# Patient Record
Sex: FEMALE | Race: WHITE | NOT HISPANIC OR LATINO | URBAN - METROPOLITAN AREA
[De-identification: names, ages, dates, MRNs, and addresses within clinical notes are randomized per-mention and may not be internally consistent; named-entity substitution may affect disease eponyms.]

---

## 2019-08-27 ENCOUNTER — OFFICE (OUTPATIENT)
Dept: URBAN - METROPOLITAN AREA CLINIC 19 | Facility: CLINIC | Age: 49
End: 2019-08-27

## 2019-08-27 VITALS
SYSTOLIC BLOOD PRESSURE: 117 MMHG | HEART RATE: 70 BPM | WEIGHT: 142 LBS | HEIGHT: 65 IN | DIASTOLIC BLOOD PRESSURE: 75 MMHG

## 2019-08-27 DIAGNOSIS — K58.9 IRRITABLE BOWEL SYNDROME WITHOUT DIARRHEA: ICD-10-CM

## 2019-08-27 DIAGNOSIS — R10.84 GENERALIZED ABDOMINAL PAIN: ICD-10-CM

## 2019-08-27 PROCEDURE — 99203 OFFICE O/P NEW LOW 30 MIN: CPT | Performed by: INTERNAL MEDICINE

## 2019-08-27 RX ORDER — DICYCLOMINE HYDROCHLORIDE 20 MG/1
TABLET ORAL
Qty: 60 | Refills: 11 | Status: ACTIVE
End: 2023-05-09

## 2020-03-03 ENCOUNTER — OFFICE (OUTPATIENT)
Dept: URBAN - METROPOLITAN AREA CLINIC 19 | Facility: CLINIC | Age: 50
End: 2020-03-03

## 2020-03-03 VITALS
HEART RATE: 79 BPM | HEIGHT: 65 IN | SYSTOLIC BLOOD PRESSURE: 129 MMHG | DIASTOLIC BLOOD PRESSURE: 85 MMHG | WEIGHT: 152 LBS

## 2020-03-03 DIAGNOSIS — R10.84 GENERALIZED ABDOMINAL PAIN: ICD-10-CM

## 2020-03-03 DIAGNOSIS — K58.9 IRRITABLE BOWEL SYNDROME WITHOUT DIARRHEA: ICD-10-CM

## 2020-03-03 PROCEDURE — 99213 OFFICE O/P EST LOW 20 MIN: CPT | Performed by: INTERNAL MEDICINE

## 2020-03-03 RX ORDER — DICYCLOMINE HYDROCHLORIDE 20 MG/1
TABLET ORAL
Qty: 60 | Refills: 11 | Status: ACTIVE
End: 2023-05-09

## 2021-04-06 ENCOUNTER — OFFICE (OUTPATIENT)
Dept: URBAN - METROPOLITAN AREA CLINIC 19 | Facility: CLINIC | Age: 51
End: 2021-04-06

## 2021-04-06 VITALS
HEIGHT: 65 IN | DIASTOLIC BLOOD PRESSURE: 70 MMHG | HEART RATE: 75 BPM | OXYGEN SATURATION: 98 % | WEIGHT: 160 LBS | SYSTOLIC BLOOD PRESSURE: 118 MMHG

## 2021-04-06 DIAGNOSIS — K58.9 IRRITABLE BOWEL SYNDROME WITHOUT DIARRHEA: ICD-10-CM

## 2021-04-06 PROCEDURE — 99213 OFFICE O/P EST LOW 20 MIN: CPT | Performed by: INTERNAL MEDICINE

## 2021-04-06 RX ORDER — DICYCLOMINE HYDROCHLORIDE 20 MG/1
TABLET ORAL
Qty: 60 | Refills: 11 | Status: ACTIVE
End: 2023-05-09

## 2022-04-12 ENCOUNTER — OFFICE (OUTPATIENT)
Dept: URBAN - METROPOLITAN AREA CLINIC 19 | Facility: CLINIC | Age: 52
End: 2022-04-12

## 2022-04-12 VITALS
SYSTOLIC BLOOD PRESSURE: 131 MMHG | HEIGHT: 65 IN | OXYGEN SATURATION: 98 % | DIASTOLIC BLOOD PRESSURE: 86 MMHG | WEIGHT: 155 LBS | HEART RATE: 75 BPM

## 2022-04-12 DIAGNOSIS — K58.9 IRRITABLE BOWEL SYNDROME WITHOUT DIARRHEA: ICD-10-CM

## 2022-04-12 PROCEDURE — 99213 OFFICE O/P EST LOW 20 MIN: CPT | Performed by: INTERNAL MEDICINE

## 2022-04-12 RX ORDER — DICYCLOMINE HYDROCHLORIDE 20 MG/1
TABLET ORAL
Qty: 60 | Refills: 11 | Status: ACTIVE
End: 2023-05-09

## 2023-05-02 ENCOUNTER — OFFICE (OUTPATIENT)
Dept: URBAN - METROPOLITAN AREA CLINIC 19 | Facility: CLINIC | Age: 53
End: 2023-05-02

## 2023-05-02 VITALS
OXYGEN SATURATION: 99 % | HEIGHT: 65 IN | HEART RATE: 70 BPM | DIASTOLIC BLOOD PRESSURE: 84 MMHG | SYSTOLIC BLOOD PRESSURE: 150 MMHG | WEIGHT: 155 LBS

## 2023-05-02 DIAGNOSIS — R10.84 GENERALIZED ABDOMINAL PAIN: ICD-10-CM

## 2023-05-02 DIAGNOSIS — K58.9 IRRITABLE BOWEL SYNDROME WITHOUT DIARRHEA: ICD-10-CM

## 2023-05-02 PROCEDURE — 99214 OFFICE O/P EST MOD 30 MIN: CPT | Performed by: INTERNAL MEDICINE

## 2023-05-02 RX ORDER — DICYCLOMINE HYDROCHLORIDE 20 MG/1
TABLET ORAL
Qty: 60 | Refills: 11 | Status: ACTIVE
End: 2023-05-09

## 2023-05-02 RX ORDER — SODIUM PICOSULFATE, MAGNESIUM OXIDE, AND ANHYDROUS CITRIC ACID 10; 3.5; 12 MG/160ML; G/160ML; G/160ML
LIQUID ORAL
Qty: 320 | Refills: 0 | Status: COMPLETED
Start: 2023-05-02 | End: 2024-06-20

## 2024-08-17 PROBLEM — K57.30 DIVERTICULOSIS OF LARGE INTESTINE WITHOUT PERFORATION OR ABS: Status: ACTIVE | Noted: 2024-08-17

## 2024-08-17 PROBLEM — K63.5 POLYP OF COLON: Status: ACTIVE | Noted: 2024-08-17

## 2025-07-24 ENCOUNTER — OFFICE (OUTPATIENT)
Dept: URBAN - METROPOLITAN AREA CLINIC 11 | Facility: CLINIC | Age: 55
End: 2025-07-24
Payer: COMMERCIAL

## 2025-07-24 VITALS
OXYGEN SATURATION: 97 % | WEIGHT: 162 LBS | SYSTOLIC BLOOD PRESSURE: 128 MMHG | HEIGHT: 65 IN | DIASTOLIC BLOOD PRESSURE: 81 MMHG | HEART RATE: 77 BPM

## 2025-07-24 DIAGNOSIS — K58.9 IRRITABLE BOWEL SYNDROME, UNSPECIFIED: ICD-10-CM

## 2025-07-24 DIAGNOSIS — R10.84 GENERALIZED ABDOMINAL PAIN: ICD-10-CM

## 2025-07-24 PROCEDURE — 99214 OFFICE O/P EST MOD 30 MIN: CPT | Performed by: NURSE PRACTITIONER

## 2025-07-24 RX ORDER — DICYCLOMINE HYDROCHLORIDE 20 MG/1
TABLET ORAL
Qty: 120 | Refills: 12 | Status: ACTIVE
End: 2023-05-09

## 2025-07-24 NOTE — SERVICENOTES
her IBS and abdominal pain are doing well on dicyclomine.  Will continue she is having pretty regular bowel movements on her own.  Recommended taking MiraLax as needed for constipation.  Samples given to patient today.  Overall she feels well and has no major complaints.  Will see back in 1 year or sooner if needed

## 2025-07-24 NOTE — SERVICEHPINOTES
Ms. Meyer is a 53yo F that presents for follow-up regarding her history of irritable bowel syndrome. She initially presented to [facility] in August 2019 with longstanding irritable bowel syndrome for over 30 years. She sent that she believes this all started around the time when her father passed away due to a long reardon with cancer which was in the 80s. After this she noticed that she was more constipated at baseline and we have abdominal pain. She said she underwent a fairly extensive workup at that time with imaging, blood test, an endoscopy and was ultimately told she had irritable bowel syndrome. Over time she has determine certain foods that seem to trigger her symptoms such as fatty and very rich foods. She also has noticed that anxiety and stress are exacerbated her symptoms. It appears that she tried some conservative measures but did not have great results and ultimately was tried on Linzess but did not tolerate this medication due to diarrhea. Ultimately she was started on Bentyl and with this medication was able to control her symptoms very well along with dietary modifications and regular exercise. Therefore, when I saw her in clinic initially, she had been on Bentyl for many years and actually multiple decades. She was previously seen by my partner, Dr. Mendiola for history of IBS and also underwent a colonoscopy which was without significant abnormality and a 10 year recall was set for 2023.    
ruddy fox On 05/02/2023, at her last visit she was doing well overall.  She continues to do well.  She will intermittently have some symptoms.  She has intermittent abdominal pain during times of constipation which seems to respond to dicyclomine.  she continues to monitor her food triggers and has discovered that milk is a trigger.  Interestingly not all dairy seems to trigger her symptoms regarding abdominal pain.  She is due for colonoscopy and we discussed this.  
ruddy fox On 07/24/2025, she has been lost to follow-up since May 2023.  Overall, she is doing well and has no major complaints or concerns.  She continues on dicyclomine for her abdominal abdominal pain and constipation.  She does take the dicyclomine every night but occasionally has to take it more times throughout the day.   She denies any nausea, vomiting, abdominal pain.